# Patient Record
Sex: FEMALE | Race: BLACK OR AFRICAN AMERICAN | NOT HISPANIC OR LATINO | Employment: FULL TIME | URBAN - METROPOLITAN AREA
[De-identification: names, ages, dates, MRNs, and addresses within clinical notes are randomized per-mention and may not be internally consistent; named-entity substitution may affect disease eponyms.]

---

## 2021-03-20 PROBLEM — Z01.419 WELL WOMAN EXAM: Status: ACTIVE | Noted: 2021-03-20

## 2021-03-24 ENCOUNTER — OFFICE VISIT (OUTPATIENT)
Dept: OBSTETRICS AND GYNECOLOGY | Facility: CLINIC | Age: 41
End: 2021-03-24

## 2021-03-24 VITALS — SYSTOLIC BLOOD PRESSURE: 122 MMHG | DIASTOLIC BLOOD PRESSURE: 76 MMHG | RESPIRATION RATE: 14 BRPM | WEIGHT: 236 LBS

## 2021-03-24 DIAGNOSIS — N92.0 MENORRHAGIA WITH REGULAR CYCLE: ICD-10-CM

## 2021-03-24 DIAGNOSIS — Z30.09 BIRTH CONTROL COUNSELING: Primary | ICD-10-CM

## 2021-03-24 PROBLEM — Z97.5 IUD (INTRAUTERINE DEVICE) IN PLACE: Status: ACTIVE | Noted: 2021-03-24

## 2021-03-24 PROCEDURE — 99203 OFFICE O/P NEW LOW 30 MIN: CPT | Performed by: OBSTETRICS & GYNECOLOGY

## 2021-03-24 NOTE — PROGRESS NOTES
Subjective   Chief Complaint   Patient presents with   • Gynecologic Exam       Hilaria Gardner is a 40 y.o. year old .  Patient's last menstrual period was 2021 (approximate).  She presents because of wanting to talk about contraception.  She has a Mirena.  It is past due for being changed.  It was placed may be 2015.  She was happy with that and would like to have another Mirena if at all possible.  She has been told in the past they had a hard time seeing the string.  She is sexually active.  She has cycles that come predictably normal.  She feels drained because of the flow.  She did have a Pap smear done in  through her primary care's office.  It was done in 2020 and was normal.    She has not been sexually active for 6 months time.    The following portions of the patient's history were reviewed and updated as appropriate:current medications, allergies, past family history, past medical history, past social history and past surgical history    Social History    Tobacco Use      Smoking status: Never Smoker         Objective   /76   Resp 14   Wt 107 kg (236 lb)   LMP 2021 (Approximate)   Breastfeeding No     Lab Review   No data reviewed    Imaging   No data reviewed        Assessment   1. Contraceptive counseling.  Would like another Mirena if possible  2. Menorrhagia impacting day-to-day function     Plan   1. The following tests were ordered today: H/H.  It was explained to Hilaria that all lab test should be back within the one week after they are performed. She will be notified about the results, regardless of the findings. If she has not been contacted by the office within 2 weeks after the test has been performed, it is her responsibility to contact us to learn about her results.  2. The following data needs to be obtained to update her medical records: last PAP.  3. The importance of keeping all planned follow-up and taking all medications as prescribed was  emphasized.  4. Follow up IUD replacement after menses         This note was electronically signed.    Shakir Auguste M.D.  March 24, 2021    Total time spent today with Hilaria  was 30-44 minutes (level 3) - contraceptive choices and efficacy rates and pathophysiology of her presenting problem along with plans for any diagnositc work-up and treatment.    Note: Speech recognition transcription software may have been used to create portions of this document.  An attempt at proofreading has been made but errors in transcription could still be present.

## 2021-12-15 ENCOUNTER — OFFICE VISIT (OUTPATIENT)
Dept: FAMILY MEDICINE CLINIC | Facility: CLINIC | Age: 41
End: 2021-12-15

## 2021-12-15 ENCOUNTER — TRANSCRIBE ORDERS (OUTPATIENT)
Dept: OBSTETRICS AND GYNECOLOGY | Facility: CLINIC | Age: 41
End: 2021-12-15

## 2021-12-15 VITALS
WEIGHT: 237.8 LBS | DIASTOLIC BLOOD PRESSURE: 84 MMHG | SYSTOLIC BLOOD PRESSURE: 128 MMHG | BODY MASS INDEX: 36.04 KG/M2 | HEIGHT: 68 IN | RESPIRATION RATE: 16 BRPM | HEART RATE: 75 BPM | TEMPERATURE: 96.9 F

## 2021-12-15 DIAGNOSIS — Z12.31 ENCOUNTER FOR SCREENING MAMMOGRAM FOR MALIGNANT NEOPLASM OF BREAST: ICD-10-CM

## 2021-12-15 DIAGNOSIS — N62 LARGE BREASTS: ICD-10-CM

## 2021-12-15 DIAGNOSIS — Z00.00 ANNUAL PHYSICAL EXAM: Primary | ICD-10-CM

## 2021-12-15 PROCEDURE — 99386 PREV VISIT NEW AGE 40-64: CPT | Performed by: STUDENT IN AN ORGANIZED HEALTH CARE EDUCATION/TRAINING PROGRAM

## 2021-12-15 PROCEDURE — 99202 OFFICE O/P NEW SF 15 MIN: CPT | Performed by: STUDENT IN AN ORGANIZED HEALTH CARE EDUCATION/TRAINING PROGRAM

## 2021-12-15 NOTE — ASSESSMENT & PLAN NOTE
Counseled on diet and exercise including cardio and strength training 50 minutes of moderate exercise 3 times per week which she seems mani getting with her .   Had covid vaccine. Declines flu. Will schedule apt with her obgyn. Mammogram ordered.

## 2021-12-15 NOTE — PROGRESS NOTES
New Patient Office Visit      Patient Name: Hilaria Saldivar  : 1980   MRN: [id number]     Chief Complaint:  Back Pain (early summer, gotten worse  ) and Establish Care     History of Present Illness:     Back pain started in college. She says large breasts run in her family. With her second son she was at a G or H and she would like to consider breast reduction. She had PT ordered but was overwhelming with a new baby. She has lost 50-55 lbs but still has back pain. She does back strengthening exercising with weights and has been since July. Has been working with a . Her pain is an aching pain in the middle of her back mostly and in her shoulders. Says some numbness in her trap muscles bilaterally on occasion. She has taken aleve for the pain which has helped some.     Will order mammogram. Has OBGYN and will call them for pap smear. Had covid vaccine.     Subjective        History reviewed. No pertinent past medical history.    Past Surgical History:   Procedure Laterality Date   • LAPAROSCOPIC CHOLECYSTECTOMY  2016       Family History   Problem Relation Age of Onset   • Diabetes Mother    • No Known Problems Father    • Diabetes Maternal Grandmother    • Hypertension Maternal Grandmother    • No Known Problems Maternal Grandfather    • No Known Problems Paternal Grandmother    • No Known Problems Paternal Grandfather        Social History     Socioeconomic History   • Marital status:    Tobacco Use   • Smoking status: Never Smoker   • Smokeless tobacco: Never Used   Substance and Sexual Activity   • Alcohol use: Not Currently     Comment: occasional wine    • Drug use: Never   • Sexual activity: Not Currently     Partners: Male        No current outpatient medications on file.    No Known Allergies    Objective     Physical Exam:  Vitals:    12/15/21 1053   BP: 128/84   Pulse: 75   Resp: 16   Temp: 96.9 °F (36.1 °C)   TempSrc: Temporal   SpO2: Comment: unable to get due to  "finger nail polish   Weight: 108 kg (237 lb 12.8 oz)   Height: 172.7 cm (68\")   PainSc:   7   PainLoc: Back      Body mass index is 36.16 kg/m².     Physical Exam  Constitutional:       General: She is not in acute distress.     Appearance: Normal appearance.   HENT:      Head: Normocephalic and atraumatic.   Eyes:      Extraocular Movements: Extraocular movements intact.   Cardiovascular:      Rate and Rhythm: Normal rate and regular rhythm.      Heart sounds: No murmur heard.      Pulmonary:      Effort: Pulmonary effort is normal. No respiratory distress.      Breath sounds: Normal breath sounds.   Abdominal:      General: Abdomen is flat.   Musculoskeletal:         General: No swelling.      Cervical back: Normal range of motion.   Skin:     Findings: No rash.   Neurological:      General: No focal deficit present.      Mental Status: She is alert.   Psychiatric:         Mood and Affect: Mood normal.              Assessment / Plan      Assessment/Plan:   Diagnoses and all orders for this visit:    1. Annual physical exam (Primary)  Assessment & Plan:  Counseled on diet and exercise including cardio and strength training 50 minutes of moderate exercise 3 times per week which she seems mani getting with her .   Had covid vaccine. Declines flu. Will schedule apt with her obgyn. Mammogram ordered.          2. Encounter for screening mammogram for malignant neoplasm of breast  -     Mammo screening digital tomosynthesis bilateral w CAD; Future    3. Large breasts  Assessment & Plan:  Refer to plastic surgeon for back pain which seems to be related to large breasts.     Orders:  -     Ambulatory Referral to Plastic Surgery       Return in about 1 year (around 12/15/2022).       Caitlin Garcia D.O.  [facility]     "

## 2022-01-04 ENCOUNTER — TELEPHONE (OUTPATIENT)
Dept: FAMILY MEDICINE CLINIC | Facility: CLINIC | Age: 42
End: 2022-01-04

## 2022-01-05 DIAGNOSIS — M54.9 BACK PAIN, UNSPECIFIED BACK LOCATION, UNSPECIFIED BACK PAIN LATERALITY, UNSPECIFIED CHRONICITY: Primary | ICD-10-CM

## 2022-01-25 ENCOUNTER — APPOINTMENT (OUTPATIENT)
Dept: MAMMOGRAPHY | Facility: HOSPITAL | Age: 42
End: 2022-01-25

## 2022-01-28 ENCOUNTER — HOSPITAL ENCOUNTER (OUTPATIENT)
Dept: MAMMOGRAPHY | Facility: HOSPITAL | Age: 42
Discharge: HOME | End: 2022-01-28
Admitting: STUDENT IN AN ORGANIZED HEALTH CARE EDUCATION/TRAINING PROGRAM

## 2022-01-28 DIAGNOSIS — Z12.31 ENCOUNTER FOR SCREENING MAMMOGRAM FOR MALIGNANT NEOPLASM OF BREAST: ICD-10-CM

## 2022-01-28 PROCEDURE — 77063 BREAST TOMOSYNTHESIS BI: CPT

## 2022-01-28 PROCEDURE — 77063 BREAST TOMOSYNTHESIS BI: CPT | Performed by: RADIOLOGY

## 2022-01-28 PROCEDURE — 77067 SCR MAMMO BI INCL CAD: CPT

## 2022-01-28 PROCEDURE — 77067 SCR MAMMO BI INCL CAD: CPT | Performed by: RADIOLOGY

## 2022-03-07 ENCOUNTER — TELEPHONE (OUTPATIENT)
Dept: FAMILY MEDICINE CLINIC | Facility: CLINIC | Age: 42
End: 2022-03-07

## 2022-03-07 NOTE — TELEPHONE ENCOUNTER
Caller: Hilaria Saldivar    Relationship: Self    Best call back number: [phone]       What was the call regarding: PATIENT STATED THAT SHE IS DOING PHYSICAL THERAPY.  PATIENT ASKED HOW MANY REQUIRED SESSIONS IS SHE SUPPOSE TO DO.  PATIENT STATED THAT SHE HAS DONE 10 SESSIONS.     Do you require a callback: YES

## 2022-04-06 ENCOUNTER — TELEPHONE (OUTPATIENT)
Dept: FAMILY MEDICINE CLINIC | Facility: CLINIC | Age: 42
End: 2022-04-06

## 2022-04-06 NOTE — TELEPHONE ENCOUNTER
PATIENT CALLED TO LET YOU KNOW THAT SHE HAS COMPLETED HER THERAPY SESSIONS AND WANTS TO KNOW IF YOU HAVE LOCATED A SURGEON FOR HER OR IS SHE TO FOLLOW BACK UP WITH YOU.      Hilaria Saldivar    [phone]

## 2022-04-15 NOTE — TELEPHONE ENCOUNTER
Returned patient's call and informed her of note made by Patricia Banad. Patient stated her understanding. And stated she would have [facility] fax over her information.

## 2022-04-15 NOTE — TELEPHONE ENCOUNTER
Caller: Hilaria Saldivar    Relationship to patient: Self    Best call back number: [phone]    Patient is needing: PATIENT STATED THAT SHE GOT DISCONNECTED FROM [facility] AND WAS CALLING BACK TO SEE WHAT SHE WAS SAYING BECAUSE SHE DID NOT GET TO HEAR ANYTHING

## 2022-04-25 ENCOUNTER — TELEPHONE (OUTPATIENT)
Dept: FAMILY MEDICINE CLINIC | Facility: CLINIC | Age: 42
End: 2022-04-25

## 2022-04-25 NOTE — TELEPHONE ENCOUNTER
----- Message from Caitlin Garcia DO sent at 4/25/2022  2:19 PM EDT -----  Regarding: FW: Updates   Looks like we have [facility] notes from Jan and they wanted 30 more days of pt . We may need more recent for the referral  ----- Message -----  From: Pallazola, Amanda G, MA  Sent: 4/25/2022   1:49 PM EDT  To: Caitlin Garcia DO  Subject: FW: Updates                                        ----- Message -----  From: Hilaria Saldivar  Sent: 4/25/2022   5:36 AM EDT  To: [facility]  Subject: Updates                                          Kwaku Cedeno    I am checking back to see if Dr. Garcia received everything from [facility] and if updates regarding the surgery possible date?

## 2022-05-05 DIAGNOSIS — M25.50 ARTHRALGIA, UNSPECIFIED JOINT: ICD-10-CM

## 2022-05-05 DIAGNOSIS — M77.9 ENTHESOPATHY: ICD-10-CM

## 2022-05-05 DIAGNOSIS — R76.8 POSITIVE ANA (ANTINUCLEAR ANTIBODY): Primary | ICD-10-CM

## 2022-07-07 ENCOUNTER — OFFICE VISIT (OUTPATIENT)
Dept: OBSTETRICS AND GYNECOLOGY | Facility: CLINIC | Age: 42
End: 2022-07-07

## 2022-07-07 VITALS
WEIGHT: 237 LBS | HEIGHT: 68 IN | DIASTOLIC BLOOD PRESSURE: 70 MMHG | SYSTOLIC BLOOD PRESSURE: 110 MMHG | BODY MASS INDEX: 35.92 KG/M2

## 2022-07-07 DIAGNOSIS — Z30.432 ENCOUNTER FOR IUD REMOVAL: ICD-10-CM

## 2022-07-07 DIAGNOSIS — Z01.419 ENCOUNTER FOR GYNECOLOGICAL EXAMINATION WITHOUT ABNORMAL FINDING: Primary | ICD-10-CM

## 2022-07-07 PROCEDURE — 99396 PREV VISIT EST AGE 40-64: CPT | Performed by: NURSE PRACTITIONER

## 2022-07-07 PROCEDURE — 58301 REMOVE INTRAUTERINE DEVICE: CPT | Performed by: NURSE PRACTITIONER

## 2022-07-07 RX ORDER — OFLOXACIN 3 MG/ML
SOLUTION/ DROPS OPHTHALMIC
COMMUNITY
Start: 2022-06-09

## 2022-07-14 NOTE — PROGRESS NOTES
IUD Insertion    No LMP recorded. (Menstrual status: Other).  No sic since April. upt negative in office today  Date of procedure:  07/25/22  Risks and benefits discussed? yes  All questions answered? yes  Consents given by The patient  Written consent obtained? yes    Local anesthesia used:  no    Procedure documentation:    After verifying the patient had a low probability of being pregnant and met the criteria for insertion, a sterile speculum has placed and the cervix was cleansed with an antiseptic solution.  Vaginal discharge was scant.  The anterior lip of the cervix was grasped with a tenaculum and the uterine cavity was gently sounded. There was no difficulty passing the sound through the cervix.  Cervical dilation did not need to be performed prior to placing the IUD.  The uterus was midposition and sounded to 9 cms.  The Mirena was then prepared per the manufacturers instructions.    The Mirena was advanced to a point 2 cms from the fundus and then the arms were released from the sheath.  The device was advanced to the fundus and the device was released fully from the sheath. The string was cut 3 cms in length.  Bleeding from the cervix was scant.    She tolerated the procedure without any difficulty.     Post procedure instructions:It was reviewed that the Mirena will not alter the timing of when she bleeds but it may decrease the quantity of flow and cramps.  Roughly 30% of people will be amenorrheic over time.  Efficacy rate of 99.2% over 7 years was discussed.  Spontaneous expulsion rate of 1-2% was also discussed.  If she has any issue with fever or excessive bleeding or pain she is to call the office immediately.  Otherwise I would like to see her back in 6 weeks for string check.   This note was electronically signed.  FRANSISCO Cason  07/25/2022

## 2022-07-25 ENCOUNTER — OFFICE VISIT (OUTPATIENT)
Dept: OBSTETRICS AND GYNECOLOGY | Facility: CLINIC | Age: 42
End: 2022-07-25

## 2022-07-25 VITALS
HEIGHT: 68 IN | BODY MASS INDEX: 35.8 KG/M2 | WEIGHT: 236.2 LBS | SYSTOLIC BLOOD PRESSURE: 130 MMHG | DIASTOLIC BLOOD PRESSURE: 80 MMHG

## 2022-07-25 DIAGNOSIS — Z30.014 ENCOUNTER FOR INITIAL PRESCRIPTION OF INTRAUTERINE CONTRACEPTIVE DEVICE (IUD): Primary | ICD-10-CM

## 2022-07-25 LAB
B-HCG UR QL: NEGATIVE
EXPIRATION DATE: NORMAL
INTERNAL NEGATIVE CONTROL: NORMAL
INTERNAL POSITIVE CONTROL: NORMAL
Lab: NORMAL

## 2022-07-25 PROCEDURE — 81025 URINE PREGNANCY TEST: CPT | Performed by: NURSE PRACTITIONER

## 2022-07-25 PROCEDURE — 58300 INSERT INTRAUTERINE DEVICE: CPT | Performed by: NURSE PRACTITIONER

## 2022-07-27 ENCOUNTER — PATIENT MESSAGE (OUTPATIENT)
Dept: OBSTETRICS AND GYNECOLOGY | Facility: CLINIC | Age: 42
End: 2022-07-27

## 2022-07-27 DIAGNOSIS — Z30.431 IUD CHECK UP: Primary | ICD-10-CM

## 2022-08-04 ENCOUNTER — OFFICE VISIT (OUTPATIENT)
Dept: OBSTETRICS AND GYNECOLOGY | Facility: CLINIC | Age: 42
End: 2022-08-04

## 2022-08-04 VITALS
SYSTOLIC BLOOD PRESSURE: 130 MMHG | WEIGHT: 240 LBS | HEIGHT: 68 IN | BODY MASS INDEX: 36.37 KG/M2 | DIASTOLIC BLOOD PRESSURE: 80 MMHG

## 2022-08-04 DIAGNOSIS — N89.8 VAGINAL IRRITATION: Primary | ICD-10-CM

## 2022-08-04 PROCEDURE — 99213 OFFICE O/P EST LOW 20 MIN: CPT | Performed by: NURSE PRACTITIONER

## 2022-08-04 RX ORDER — TRIAMCINOLONE ACETONIDE 5 MG/G
1 CREAM TOPICAL 2 TIMES DAILY
Qty: 15 G | Refills: 0 | Status: SHIPPED | OUTPATIENT
Start: 2022-08-04

## 2022-08-04 NOTE — PROGRESS NOTES
"Subjective   Chief Complaint   Patient presents with   • Follow-up     Pt states that she is having itching at the entrance to here her iud lays     Hilaria Saldivar is a 42 y.o. year old .  No LMP recorded (lmp unknown). (Menstrual status: Other).  She presents to be seen because of vaginal irritation since placement of her IUD.  She had a Mirena placed about 2 weeks ago.  She denies pelvic pain or bleeding.  She states her vaginal irritation is mainly at the introitus.  She reports some increased white vaginal discharge.  Reports itching at the introitus.  Denies any vaginal odor.  She had negative STI screening her last visit.  She has her surgery date for her breast reduction on .    The following portions of the patient's history were reviewed and updated as appropriate:current medications and allergies    Social History    Tobacco Use      Smoking status: Never Smoker      Smokeless tobacco: Never Used         Objective   /80 (BP Location: Right arm, Patient Position: Sitting, Cuff Size: Adult)   Ht 172.7 cm (68\")   Wt 109 kg (240 lb)   LMP  (LMP Unknown)   Breastfeeding No   BMI 36.49 kg/m²    Pelvic exam: VULVA: normal appearing vulva with no masses, tenderness or lesions, VAGINA: normal appearing vagina with normal color and discharge, no lesions, CERVIX: normal appearing cervix without discharge or lesions, IUD strings noted in correct placement extending through the cervical os, WET MOUNT done - results: negative for pathogens, normal epithelial cells.  1 swab sent for vaginosis panel      Lab Review   STD swabs for GC, chlamydia and trichimoniasis    Imaging   No data reviewed        Assessment   1. Vaginal irritation  2. IUD in place     Plan   1. Discussed blood prep results with patient.  We will start low-dose steroid cream for twice daily use at introitus.  Will await 1 swab results for further treatment of vaginal irritation.  2. IUD strings noted in correct placement on " exam today.  Return to care for 6-week IUD follow-up as scheduled.  3. The importance of keeping all planned follow-up and taking all medications as prescribed was emphasized.  4. No follow-ups on file.    No orders of the defined types were placed in this encounter.         This note was electronically signed.    Georige Cobb, FRANSISCO  August 4, 2022

## 2022-08-30 ENCOUNTER — TELEPHONE (OUTPATIENT)
Dept: OBSTETRICS AND GYNECOLOGY | Facility: CLINIC | Age: 42
End: 2022-08-30

## 2022-08-30 NOTE — TELEPHONE ENCOUNTER
Caller: Hilaria Saldivar    Relationship to patient: Self    Best call back number:     Type of visit: GYN FOLLOW UP    If rescheduling, when is the original appointment: 8/30/2022    Additional notes: PT HAD TO R/S DUE TO SURGERY APPT FOR TODAY.

## 2022-10-17 ENCOUNTER — OFFICE VISIT (OUTPATIENT)
Dept: OBSTETRICS AND GYNECOLOGY | Facility: CLINIC | Age: 42
End: 2022-10-17

## 2022-10-17 VITALS — BODY MASS INDEX: 33.6 KG/M2 | WEIGHT: 221 LBS | RESPIRATION RATE: 14 BRPM

## 2022-10-17 DIAGNOSIS — Z30.431 IUD CHECK UP: Primary | ICD-10-CM

## 2022-10-17 PROCEDURE — 99212 OFFICE O/P EST SF 10 MIN: CPT | Performed by: NURSE PRACTITIONER

## 2022-10-17 NOTE — PROGRESS NOTES
Subjective   Chief Complaint   Patient presents with   • Follow-up     Iud string check     Hilaria Saldivar is a 42 y.o. year old .  No LMP recorded. (Menstrual status: Other).  She presents to be seen for iud string check. At her last appt she had some vaginal irritation.This has since resolved.  Since her last visit she has had breast reduction surgery. She states her back pain has already improved.   Her last period she has 5 days of dark brown spotting. She denies painful cramping.   The following portions of the patient's history were reviewed and updated as appropriate:current medications and allergies    Social History    Tobacco Use      Smoking status: Never      Smokeless tobacco: Never         Objective   Resp 14   Wt 100 kg (221 lb)   BMI 33.60 kg/m²   Pelvic exam: VULVA: normal appearing vulva with no masses, tenderness or lesions, VAGINA: normal appearing vagina with normal color and discharge, no lesions, CERVIX: normal appearing cervix without discharge or lesions, iud strings noted in correct placement extending through cervical os.    Lab Review   No data reviewed    Imaging   No data reviewed        Assessment   1. iud check     Plan   1. iud noted in correct placement in office today.   2. The importance of keeping all planned follow-up and taking all medications as prescribed was emphasized.  3. rtc for annual or prn    No orders of the defined types were placed in this encounter.         This note was electronically signed.    Georgie Cobb, FRANSISCO  2022

## 2022-12-15 ENCOUNTER — OFFICE VISIT (OUTPATIENT)
Dept: OBSTETRICS AND GYNECOLOGY | Facility: CLINIC | Age: 42
End: 2022-12-15

## 2022-12-15 VITALS
DIASTOLIC BLOOD PRESSURE: 80 MMHG | HEIGHT: 68 IN | BODY MASS INDEX: 35.52 KG/M2 | WEIGHT: 234.4 LBS | SYSTOLIC BLOOD PRESSURE: 110 MMHG

## 2022-12-15 DIAGNOSIS — Z11.3 ROUTINE SCREENING FOR STI (SEXUALLY TRANSMITTED INFECTION): Primary | ICD-10-CM

## 2022-12-15 PROCEDURE — 99212 OFFICE O/P EST SF 10 MIN: CPT | Performed by: NURSE PRACTITIONER

## 2022-12-15 NOTE — PROGRESS NOTES
"Subjective   Chief Complaint   Patient presents with   • Exposure to STD     Hilaria Saldivar is a 42 y.o. year old .  No LMP recorded (lmp unknown). Patient has had an implant.  She presents to be seen for STI screening.  She recently has become sexually active with a new partner and has not been consistent with condom use.  She denies any vaginal irritation or change in her discharge.  She is satisfied with her IUD.  Declines blood work today.    The following portions of the patient's history were reviewed and updated as appropriate:current medications and allergies    Social History    Tobacco Use      Smoking status: Never      Smokeless tobacco: Never         Objective   /80 (BP Location: Right arm, Patient Position: Sitting, Cuff Size: Adult)   Ht 172.7 cm (68\")   Wt 106 kg (234 lb 6.4 oz)   LMP  (LMP Unknown) Comment: Mirena  BMI 35.64 kg/m²   Pelvic exam: VULVA: normal appearing vulva with no masses, tenderness or lesions, VAGINA: normal appearing vagina with normal color and discharge, no lesions, CERVIX: normal appearing cervix without discharge or lesions, iud strings noted in correct placement .    Lab Review   No data reviewed    Imaging   No data reviewed        Assessment   1. Screening for sti     Plan   1. 1 swab collected for STI screening today.  Encouraged use of condoms with for decrease sti risk  2. The importance of keeping all planned follow-up and taking all medications as prescribed was emphasized.  3. rtc for annual or prn     No orders of the defined types were placed in this encounter.         This note was electronically signed.    Georgie Cobb, FRANSISCO  December 15, 2022        "

## 2022-12-19 DIAGNOSIS — Z11.3 ROUTINE SCREENING FOR STI (SEXUALLY TRANSMITTED INFECTION): ICD-10-CM

## 2023-03-02 ENCOUNTER — OFFICE VISIT (OUTPATIENT)
Dept: OBSTETRICS AND GYNECOLOGY | Facility: CLINIC | Age: 43
End: 2023-03-02

## 2023-03-02 VITALS
WEIGHT: 238 LBS | DIASTOLIC BLOOD PRESSURE: 80 MMHG | SYSTOLIC BLOOD PRESSURE: 120 MMHG | HEIGHT: 68 IN | BODY MASS INDEX: 36.07 KG/M2

## 2023-03-02 DIAGNOSIS — N88.9 CERVICAL LESION: ICD-10-CM

## 2023-03-02 DIAGNOSIS — N89.8 VAGINAL ODOR: Primary | ICD-10-CM

## 2023-03-02 DIAGNOSIS — Z71.1 CONCERN ABOUT STD IN FEMALE WITHOUT DIAGNOSIS: ICD-10-CM

## 2023-03-02 PROCEDURE — 99213 OFFICE O/P EST LOW 20 MIN: CPT | Performed by: NURSE PRACTITIONER

## 2023-03-02 RX ORDER — METRONIDAZOLE 500 MG/1
500 TABLET ORAL 2 TIMES DAILY
Qty: 14 TABLET | Refills: 0 | Status: SHIPPED | OUTPATIENT
Start: 2023-03-02 | End: 2023-03-06 | Stop reason: SDUPTHER

## 2023-03-02 NOTE — PROGRESS NOTES
"Subjective   Chief Complaint   Patient presents with   • Vaginal Discharge     Discharge for the pass 2 week and now has fishy smell and itchy     Hilaria Saldivar is a 42 y.o. year old .  No LMP recorded (lmp unknown). Patient has had an implant.  She presents to be seen because of vaginal odor which has been present for about 2 weeks. She had a new sexual partner and noticed odor after intercourse. She also has some burning after urinating.     The following portions of the patient's history were reviewed and updated as appropriate:current medications and allergies    Social History    Tobacco Use      Smoking status: Never      Smokeless tobacco: Never         Objective   /80 (BP Location: Right arm, Patient Position: Sitting, Cuff Size: Adult)   Ht 172.7 cm (68\")   Wt 108 kg (238 lb)   LMP  (LMP Unknown)   BMI 36.19 kg/m²   Pelvic exam: VULVA: normal appearing vulva with no masses, tenderness or lesions, VAGINA: normal appearing vagina with normal color and discharge, no lesions, CERVIX: normal appearing cervix without discharge or lesions, Nabothian cyst at 10-11 o'clock.  Possible nabothian cyst also noted at 11:00 however this area is more purple in color.  Pap collected.  IUD strings noted extending through cervical os.    Lab Review   No data reviewed    Imaging   No data reviewed        Assessment   1. Vaginal odor most consistent with bacterial vaginosis  2. STI screening  3. Probable nabothian cyst noted on cervix however with discoloration Pap collected     Plan   Pap and HPV were done today.  If she does not receive the results of the Pap within 2 weeks  time, she was instructed to call to find out the results.  I explained to Hilaria that the recommendations for Pap smear interval in a low risk patient's has lengthened to 5 years time if both cytology and HPV testing were normal.  I encouraged her to be seen yearly for a full physical exam including breast and pelvic exam even during the " off years when PAP's will not be performed.  1. 1 swab sent for STI screening bacterial vaginosis and yeast.  Symptoms most consistent with bacterial vaginosis prescription for Flagyl was sent to pharmacy on file.  Discussed with patient need to avoid alcohol for 24 to 40 hours after last dose of Flagyl.  She verbalized understanding plan of care.  2. The importance of keeping all planned follow-up and taking all medications as prescribed was emphasized.      No orders of the defined types were placed in this encounter.         This note was electronically signed.    Georgie Cobb, APRN  March 2, 2023

## 2023-03-06 DIAGNOSIS — N89.8 VAGINAL ODOR: ICD-10-CM

## 2023-03-06 RX ORDER — METRONIDAZOLE 500 MG/1
500 TABLET ORAL 2 TIMES DAILY
Qty: 14 TABLET | Refills: 0 | Status: SHIPPED | OUTPATIENT
Start: 2023-03-06 | End: 2023-03-13

## 2023-03-10 LAB — REF LAB TEST METHOD: NORMAL

## 2024-01-23 ENCOUNTER — TELEPHONE (OUTPATIENT)
Dept: OBSTETRICS AND GYNECOLOGY | Facility: CLINIC | Age: 44
End: 2024-01-23
Payer: MEDICAID

## 2024-01-23 NOTE — TELEPHONE ENCOUNTER
Hub staff attempted to follow warm transfer process and was unsuccessful     Caller: Hilaria Saldivar    Relationship to patient: Self    Best call back number: [phone]    Patient is needing: PT RETURNING MISSED CALL TO OFFICE FROM [place]. PT CAN BE REACHED AT ANYTIME TODAY

## 2024-01-23 NOTE — TELEPHONE ENCOUNTER
Attempted to contact pt and there was no answer.  Unable to leave message due to voicemail being full.

## 2024-01-24 ENCOUNTER — TELEPHONE (OUTPATIENT)
Dept: OBSTETRICS AND GYNECOLOGY | Facility: CLINIC | Age: 44
End: 2024-01-24
Payer: MEDICAID

## 2024-01-24 NOTE — TELEPHONE ENCOUNTER
Pt stated the reason for her call was to see if she could be seen sooner that what she was schedule for, call was forward to the front to get Pt schedule.

## 2024-01-24 NOTE — TELEPHONE ENCOUNTER
SERGIO    Received a call from patient stating she was returning a call regarding provider slot. If someone could please give her, it would be appreciated. Thank you kindly.

## 2024-01-25 ENCOUNTER — OFFICE VISIT (OUTPATIENT)
Dept: OBSTETRICS AND GYNECOLOGY | Facility: CLINIC | Age: 44
End: 2024-01-25
Payer: MEDICAID

## 2024-01-25 VITALS
BODY MASS INDEX: 38.13 KG/M2 | SYSTOLIC BLOOD PRESSURE: 120 MMHG | HEIGHT: 68 IN | WEIGHT: 251.6 LBS | DIASTOLIC BLOOD PRESSURE: 80 MMHG

## 2024-01-25 DIAGNOSIS — Z01.419 ENCOUNTER FOR GYNECOLOGICAL EXAMINATION WITHOUT ABNORMAL FINDING: ICD-10-CM

## 2024-01-25 DIAGNOSIS — R82.90 MALODOROUS URINE: ICD-10-CM

## 2024-01-25 DIAGNOSIS — Z12.31 SCREENING MAMMOGRAM FOR BREAST CANCER: ICD-10-CM

## 2024-01-25 DIAGNOSIS — Z30.431 IUD CHECK UP: ICD-10-CM

## 2024-01-25 DIAGNOSIS — N89.8 VAGINAL IRRITATION: Primary | ICD-10-CM

## 2024-01-25 NOTE — PROGRESS NOTES
Subjective   Chief Complaint   Patient presents with    Annual Exam     Well woman exam, VAGINAL DISCHARGE/ODOR / IUD CHECK AND ODOR.     Hilaria Saldivar is a 43 y.o. year old  presenting to be seen for her annual exam.   Her last Pap was 2023 with normal cytology and negative HPV cotesting.  She had a BI-RADS 1 mammogram 2022.  She states around early Nov she started having vaginal irritation which comes and goes, she noted an odor as well. She used azo over the counter (yeast and urinary support) and states this has helped some.  She has a new partner since her last visit.  SEXUAL Hx:  She is currently sexually active.  In the past year there there has been ONE new sexual partner.    Condoms are used intermittently.  She would like to be screened for STD's at today's exam.  Current birth control method: IUD - Mirena.  Placed 2022.  She is happy with her current method of contraception and does not want to discuss alternative methods of contraception.  MENSTRUAL Hx:  No LMP recorded. Patient has had an implant.  In the past 6 months her cycles have been absent.  Her menstrual flow has been absent.   Each month on average there are roughly 0 day(s) of very heavy flow.  She did have one heavy cycle about 6 months ago, none since, lasted   Intermenstrual bleeding is absent.    Post-coital bleeding is absent.  Dysmenorrhea: moderate and is not affecting her activities of daily living  PMS: is not affecting her activities of daily living  Her cycles are not a source of concern for her that she wishes to discuss today.  HEALTH Hx:  She exercises regularly: yes.  She wears her seat belt: yes.  She has concerns about domestic violence: no.  OTHER THINGS SHE WANTS TO DISCUSS TODAY:  Nothing else    The following portions of the patient's history were reviewed and updated as appropriate:problem list, current medications, allergies, past family history, past medical history, past social history, and  "past surgical history.    Social History    Tobacco Use      Smoking status: Never      Smokeless tobacco: Never    Review of Systems  Constitutional POS: nothing reported    NEG: anorexia or night sweats   Genitourinary POS:  malodorous urine    NEG: dysuria or hematuria      Gastointestinal POS: nothing reported    NEG: bloating, change in bowel habits, melena, or reflux symptoms   Integument POS: nothing reported    NEG: moles that are changing in size, shape, color or rashes   Breast POS: nothing reported    NEG: persistent breast lump, skin dimpling, or nipple discharge        Objective   /80 (BP Location: Left arm, Patient Position: Sitting, Cuff Size: Adult)   Ht 172.7 cm (68\")   Wt 114 kg (251 lb 9.6 oz)   BMI 38.26 kg/m²     General:  well developed; well nourished  no acute distress   Skin:  No suspicious lesions seen   Thyroid: normal to inspection and palpation   Breasts:  Examined in supine position  Symmetric without masses or skin dimpling  Nipples normal without inversion, lesions or discharge  There are no palpable axillary nodes  Bilateral breast reduction scars are noted   Abdomen: soft, non-tender; no masses  no umbilical or inguinal hernias are present  no hepato-splenomegaly   Pelvis: Clinical staff was present for exam  :  urethral meatus normal;  Vaginal:  normal pink mucosa without prolapse or lesions.  Cervix:  normal appearance. IUD string present - 3 cms in length;  Uterus:  normal size, shape and consistency.  Adnexa:  normal bimanual exam of the adnexa.  Rectal:  digital rectal exam not performed; anus visually normal appearing.        Assessment   Well woman with routine gynecological exam  IUD in place  Vaginal irritation  Malodorous urine     Plan     Pap was not done today.  I explained to Hilaria that the recommendations for Pap smear interval in a low risk patient has lengthened to 3 years time.  I told Hilaria she still needs to be seen in our office yearly for a full " physical including breast and pelvic exam.  She was encouraged to get yearly mammograms.  She should report any palpable breast lump(s) or skin changes regardless of mammographic findings.  I explained to Hilaria that notification regarding her mammogram results will come from the center performing the study.  Our office will not be routinely calling with mammogram results.  It is her responsibility to make sure that the results from the mammogram are communicated to her by the breast center.  If she has any questions about the results, she is welcome to call our office anytime.  1 swab collected for vaginitis and STD panel.  Discussed use of boric acid suppositories for postcoital use if her symptoms are mainly after intercourse.  Urine sent for urinalysis  The importance of keeping all planned follow-up and taking all medications as prescribed was emphasized.  Today I discussed with Hilaria the total recommended calcium intake for a premenopausal female is 1000 mg.  Ideally this should be from dietary sources.  I reviewed calcium content in various foods including milk, fortified orange juice and yogurt.  If she cannot get sufficient calcium through dietary means, it is recommended to supplement with either a multivitamin or calcium to reach her daily goal.  I also reviewed the difference in the bioavailability of calcium carbonate and calcium citrate containing supplements and the importance of taking calcium carbonate containing products with food.  Finally, vitamin D's role in calcium absorption was reviewed and a total daily vitamin D intake of 800 units was recommended.  Follow up for annual exam 1 year or as needed    No orders of the defined types were placed in this encounter.         This note was electronically signed.    Georgie Cobb, FRANSISCO  January 25, 2024

## 2024-01-26 ENCOUNTER — TELEPHONE (OUTPATIENT)
Dept: OBSTETRICS AND GYNECOLOGY | Facility: CLINIC | Age: 44
End: 2024-01-26
Payer: MEDICAID

## 2024-01-26 NOTE — TELEPHONE ENCOUNTER
[phone] called patient, mailbox is full. I sent patient a [employer] message asking her to go to the lab to leave a urine specimen and we will call her with the results.

## 2024-01-26 NOTE — TELEPHONE ENCOUNTER
I tried to call patient to make her aware that I place her urine sample in the wrong basket on 1/26/24 and it was not picked up. I needed to ask her to go to the lab and leave another sample and that the order was in her chart. I could not reach patient due to mailbox was full.

## 2024-01-27 ENCOUNTER — LAB (OUTPATIENT)
Dept: LAB | Facility: HOSPITAL | Age: 44
End: 2024-01-27
Payer: MEDICAID

## 2024-01-27 DIAGNOSIS — R82.90 MALODOROUS URINE: ICD-10-CM

## 2024-01-27 DIAGNOSIS — N89.8 VAGINAL IRRITATION: ICD-10-CM

## 2024-01-27 PROCEDURE — 81001 URINALYSIS AUTO W/SCOPE: CPT

## 2024-01-29 LAB
BACTERIA UR QL AUTO: ABNORMAL /HPF
BILIRUB UR QL STRIP: NEGATIVE
CLARITY UR: CLEAR
COLOR UR: YELLOW
GLUCOSE UR STRIP-MCNC: NEGATIVE MG/DL
HGB UR QL STRIP.AUTO: NEGATIVE
HOLD SPECIMEN: NORMAL
HYALINE CASTS UR QL AUTO: ABNORMAL /LPF
KETONES UR QL STRIP: NEGATIVE
LEUKOCYTE ESTERASE UR QL STRIP.AUTO: ABNORMAL
NITRITE UR QL STRIP: NEGATIVE
PH UR STRIP.AUTO: 5.5 [PH] (ref 5–8)
PROT UR QL STRIP: NEGATIVE
RBC # UR STRIP: ABNORMAL /HPF
REF LAB TEST METHOD: ABNORMAL
SP GR UR STRIP: 1.02 (ref 1–1.03)
SQUAMOUS #/AREA URNS HPF: ABNORMAL /HPF
UROBILINOGEN UR QL STRIP: ABNORMAL
WBC # UR STRIP: ABNORMAL /HPF

## 2024-08-16 ENCOUNTER — TELEPHONE (OUTPATIENT)
Dept: FAMILY MEDICINE CLINIC | Facility: CLINIC | Age: 44
End: 2024-08-16
Payer: COMMERCIAL

## 2024-10-11 ENCOUNTER — TELEPHONE (OUTPATIENT)
Dept: OBSTETRICS AND GYNECOLOGY | Facility: CLINIC | Age: 44
End: 2024-10-11

## 2024-10-11 NOTE — TELEPHONE ENCOUNTER
Caller: Hilaria Saldivar    Relationship to patient: Self    Best call back number: [phone] (home)       Patient is needing: PT IS PRETTY UNCOMFORTABLE. WOULD LIKE A SOONER APPT THAN 10/16/2024.

## 2024-10-14 ENCOUNTER — OFFICE VISIT (OUTPATIENT)
Dept: OBSTETRICS AND GYNECOLOGY | Facility: CLINIC | Age: 44
End: 2024-10-14
Payer: COMMERCIAL

## 2024-10-14 VITALS — WEIGHT: 241 LBS | BODY MASS INDEX: 36.64 KG/M2 | DIASTOLIC BLOOD PRESSURE: 70 MMHG | SYSTOLIC BLOOD PRESSURE: 110 MMHG

## 2024-10-14 DIAGNOSIS — N89.8 VAGINAL DISCHARGE: Primary | ICD-10-CM

## 2024-10-14 DIAGNOSIS — N89.8 VAGINAL IRRITATION: ICD-10-CM

## 2024-10-14 PROCEDURE — 99213 OFFICE O/P EST LOW 20 MIN: CPT

## 2024-10-14 RX ORDER — LEVONORGESTREL 52 MG/1
1 INTRAUTERINE DEVICE INTRAUTERINE
COMMUNITY

## 2024-10-14 NOTE — PROGRESS NOTES
Subjective   Chief Complaint   Patient presents with    Vaginal Discharge     Vag irritation and iud check     Hilaria Saldivar is a 44 y.o. year old  presenting to be seen for evaluation of an abnormal vaginal discharge. The discharge is light yellow.  Her symptoms have been present for 2 month(s).  Additional she has noticed local irritation and vulvar itching.    Prior to the onset of symptoms she was not on systemic antibiotics.  She has recently changed soaps/detergents/toilet tissue (soap- Dove to Olay with some fragrance).  Prior to this visit, she has used AZO (Urinary and yeast support) in an attempt to improve her symptoms. She reports mild improvement in symptoms.     SEXUAL Hx:  She is not currently sexually active.  In the past year there there has been ONE new sexual partner.    Condoms are never used.  She would like to be screened for STD's at today's exam.  Current birth control method: IUD - Mirena.  She is happy with her current method of contraception and does not want to discuss alternative methods of contraception..  MENSTRUAL Hx:  No LMP recorded. Patient has had an implant.  In the past 6 months her cycles have been absent.   Her menstrual flow has been absent.   Each month on average there are roughly  0 days of very heavy flow.    Intermenstrual bleeding is absent.    Post-coital bleeding is absent.  Dysmenorrhea: none and is not affecting her activities of daily living  PMS: none and is not affecting her activities of daily living  OTHER THINGS SHE WANTS TO DISCUSS TODAY:  She had one incident of spotting that lasted about a day or so about a month ago. She otherwise is amenorrheic with the Mirena IUD.     The following portions of the patient's history were reviewed and updated as appropriate:current medications, allergies, past medical history, and past social history    Review of Systems       Objective   /70   Wt 109 kg (241 lb)   BMI 36.64 kg/m²     Physical Exam  Vitals  reviewed. Exam conducted with a chaperone present.   Genitourinary:     General: Normal vulva.      Exam position: Lithotomy position.      Vagina: Normal. No vaginal discharge.      Cervix: Normal.      Comments: IUD strings visualized at an appropriate length         Lab Review   No data reviewed    Imaging   No data reviewed       Assessment & Plan   Diagnoses and all orders for this visit:    1. Vaginal discharge (Primary)  -     OneSwab - Kit, Cervix, Endocervix; Future    2. Vaginal irritation  -     OneSwab - Kit, Cervix, Endocervix; Future    No obvious signs of irritation today. Will wait for OneSwab results at treat at that time. Educated patient regarding vaginal hygiene and use of probiotic and boric acid vaginal suppositories.     The importance of keeping all planned follow-up and taking all medications as prescribed was emphasized.    Return in about 15 weeks (around 1/27/2025) for Annual physical.    No orders of the defined types were placed in this encounter.                This note was electronically signed.    Ana María Porras, FRANSISCO  October 14, 2024

## 2024-10-22 ENCOUNTER — PATIENT MESSAGE (OUTPATIENT)
Dept: OBSTETRICS AND GYNECOLOGY | Facility: CLINIC | Age: 44
End: 2024-10-22
Payer: COMMERCIAL

## 2024-12-12 ENCOUNTER — OFFICE VISIT (OUTPATIENT)
Age: 44
End: 2024-12-12
Payer: COMMERCIAL

## 2024-12-12 VITALS
WEIGHT: 255 LBS | SYSTOLIC BLOOD PRESSURE: 118 MMHG | BODY MASS INDEX: 38.77 KG/M2 | DIASTOLIC BLOOD PRESSURE: 80 MMHG | RESPIRATION RATE: 16 BRPM

## 2024-12-12 DIAGNOSIS — N89.8 VAGINAL DISCHARGE: Primary | ICD-10-CM

## 2024-12-12 NOTE — PROGRESS NOTES
Subjective   Chief Complaint   Patient presents with    Vaginal Discharge     With odor     Hilaria Saldivar is a 44 y.o. year old  presenting to be seen for evaluation of an abnormal vaginal discharge. The discharge is yellow- some days thick, some days thin. It leaves a brown stain in her underwear.  Her symptoms have been present for 2 week(s).  Additional she has noticed odor and itching just inside vagina .    Prior to the onset of symptoms she was not on systemic antibiotics.  She has recently changed soaps/detergents/toilet tissue.  Prior to this visit, she has used   OTC AZO yeast relief in an attempt to improve her symptoms. She reports it helps somewhat with odor but it does not fully resolve and comes back.    SEXUAL Hx:  She is currently sexually active.  In the past year there there has been ONE new sexual partner.    Condoms are never used.  She would not like to be screened for STD's at today's exam.  Current birth control method: IUD - Mirena.  She is happy with her current method of contraception and does not want to discuss alternative methods of contraception..  MENSTRUAL Hx:  No LMP recorded. Patient has had an implant.  In the past 6 months her cycles have been absent.   Her menstrual flow has been absent.   Each month on average there are roughly  0 days of very heavy flow.    Intermenstrual bleeding is absent.    Post-coital bleeding is absent.  Dysmenorrhea: none and is not affecting her activities of daily living  PMS: none and is not affecting her activities of daily living  OTHER THINGS SHE WANTS TO DISCUSS TODAY:  Nothing else    The following portions of the patient's history were reviewed and updated as appropriate:current medications, allergies, past medical history, and past social history    Review of Systems   Genitourinary:  Positive for vaginal discharge.          Objective   /80   Resp 16   Wt 116 kg (255 lb)   BMI 38.77 kg/m²     Physical Exam  Vitals and nursing  note reviewed. Exam conducted with a chaperone present.   Genitourinary:     General: Normal vulva.      Exam position: Lithotomy position.      Vagina: Normal. Vaginal discharge present.      Cervix: Normal.   Psychiatric:         Mood and Affect: Mood normal.         Behavior: Behavior normal.         Thought Content: Thought content normal.         Judgment: Judgment normal.          Lab Review   No data reviewed    Imaging   No data reviewed       Assessment & Plan   Diagnoses and all orders for this visit:    1. Vaginal discharge (Primary)  -     OneSwab - Kit, Cervix, Endocervix; Future    No obvious source of discharge or infectious process today. Will await OneSwab result for appropriate treatment.     Reiterated good vaginal hygiene (no soaps inside, cotton underwear, etc). Also recommended probiotic and / or vaginal boric acid suppositories. She voiced understanding.     The importance of keeping all planned follow-up and taking all medications as prescribed was emphasized.    Return in about 3 months (around 3/10/2025) for Annual physical.    No orders of the defined types were placed in this encounter.                This note was electronically signed.    Ana María Porras, FRANSISCO  December 12, 2024

## 2024-12-19 RX ORDER — METRONIDAZOLE 7.5 MG/G
1 GEL VAGINAL 2 TIMES DAILY
Qty: 1 EACH | Refills: 0 | Status: SHIPPED | OUTPATIENT
Start: 2024-12-19 | End: 2024-12-23

## 2025-01-13 ENCOUNTER — TELEPHONE (OUTPATIENT)
Dept: FAMILY MEDICINE CLINIC | Facility: CLINIC | Age: 45
End: 2025-01-13
Payer: COMMERCIAL

## 2025-03-17 ENCOUNTER — OFFICE VISIT (OUTPATIENT)
Age: 45
End: 2025-03-17
Payer: COMMERCIAL

## 2025-03-17 VITALS
DIASTOLIC BLOOD PRESSURE: 88 MMHG | SYSTOLIC BLOOD PRESSURE: 140 MMHG | HEIGHT: 67 IN | BODY MASS INDEX: 40.02 KG/M2 | WEIGHT: 255 LBS

## 2025-03-17 DIAGNOSIS — Z12.31 SCREENING MAMMOGRAM FOR BREAST CANCER: ICD-10-CM

## 2025-03-17 DIAGNOSIS — Z01.419 ENCOUNTER FOR GYNECOLOGICAL EXAMINATION WITHOUT ABNORMAL FINDING: Primary | ICD-10-CM

## 2025-03-17 PROBLEM — N62 LARGE BREASTS: Status: RESOLVED | Noted: 2021-12-15 | Resolved: 2025-03-17

## 2025-03-17 NOTE — PROGRESS NOTES
Subjective   Chief Complaint   Patient presents with    Annual Exam     Well woman exam     Hilaria Saldivar is a 44 y.o. year old  presenting to be seen for her annual exam.   She had some issues with vaginal irritation last week which she thinks was related to a change in toilet paper, improved today. She has reports of increased vaginal discharge at times, unsure if this is cyclic in nature. Does not feel like this is yeast or bv at this time. Has no concerns for sti but would like screening.  She has ordered a lactobacillus containing probiotic, has not started yet but plans to soon.   She has not had a mammogram since her breast reduction in , would like order sent today.  She said since her breast reduction she has had a great improvement in her shoulder and neck pain.  She will still get indents in her shoulder for her bra straps at times.  She states exercising is much easier after her reduction as well.  Overall she is very satisfied with results.      SEXUAL Hx:  She is currently sexually active.  In the past year there there has been NO new sexual partners.    Condoms are used intermittently.  She would not like to be screened for STD's at today's exam.  Current birth control method: IUD - Mirena. 2022  She is happy with her current method of contraception and does not want to discuss alternative methods of contraception.  MENSTRUAL Hx:  No LMP recorded (lmp unknown). Patient has had an implant.  In the past 6 months her cycles have been absent with mirena Her menstrual flow has been absent.   Each month on average there are roughly 0 day(s) of very heavy flow.    Intermenstrual bleeding is absent.    Post-coital bleeding is absent.  Dysmenorrhea: is not affecting her activities of daily living  PMS: is not affecting her activities of daily living  Her cycles are not a source of concern for her that she wishes to discuss today.  HEALTH Hx:  She exercises regularly: yes.  She wears her seat belt:  "yes.  She has concerns about domestic violence: no.  OTHER THINGS SHE WANTS TO DISCUSS TODAY:  Nothing else    The following portions of the patient's history were reviewed and updated as appropriate:problem list, current medications, allergies, past family history, past medical history, past social history, and past surgical history.    Social History    Tobacco Use      Smoking status: Never      Smokeless tobacco: Never    Review of Systems  Constitutional POS: nothing reported    NEG: anorexia or night sweats   Genitourinary POS: nothing reported    NEG: dysuria or hematuria      Gastointestinal POS: nothing reported    NEG: bloating, change in bowel habits, melena, or reflux symptoms   Integument POS: nothing reported    NEG: moles that are changing in size, shape, color or rashes   Breast POS: nothing reported    NEG: persistent breast lump, skin dimpling, or nipple discharge        Objective   /88 (BP Location: Left arm, Patient Position: Sitting, Cuff Size: Adult)   Ht 170.2 cm (67\")   Wt 116 kg (255 lb)   LMP  (LMP Unknown)   BMI 39.94 kg/m²     General:  well developed; well nourished  no acute distress  mentation appropriate  obese - Body mass index is 39.94 kg/m².   Skin:  No suspicious lesions seen   Thyroid: normal to inspection and palpation   Breasts:  Examined in supine position  Symmetric without masses or skin dimpling  Nipples normal without inversion, lesions or discharge  There are no palpable axillary nodes  Bilateral breast reduction scars are noted   Abdomen: soft, non-tender; no masses  no umbilical or inguinal hernias are present  no hepato-splenomegaly   Pelvis: Clinical staff was present for exam  :  urethral meatus normal; urethra normal:  Vaginal:  normal pink mucosa without prolapse or lesions.  Cervix:  normal appearance. IUD string present - 3 cms in length; thick mucoid discharge noted   Uterus:  normal size, shape and consistency.  Adnexa:  normal bimanual exam of the " adnexa.  Rectal:  digital rectal exam not performed; anus visually normal appearing.        Assessment   Well woman with routine gynecological exam  IUD in place  Cervical mucoid discharge     Plan     Pap and STD testing was done today.  If she does not receive the results of the Pap within 2 weeks  time, she was instructed to call to find out the results.  I explained to Hilaria that the recommendations for Pap smear interval in a low risk patient's has lengthened to 3 years time.  I encouraged her to be seen yearly for a full physical exam including breast and pelvic exam even during the off years when PAP's will not be performed.  IUD current until July 2030.  Discussed if menstrual cycles returned and are bothersome can replace IUD anytime.   Discussed discharge could be related to progesterone IUD use.  Will review STD screening when resulted.  Encouraged to start lactobacillus containing probiotic.  The importance of keeping all planned follow-up and taking all medications as prescribed was emphasized.  Today I discussed with Hilaria the total recommended calcium intake for a premenopausal female is 1000 mg.  Ideally this should be from dietary sources.  I reviewed calcium content in various foods including milk, fortified orange juice and yogurt.  If she cannot get sufficient calcium through dietary means, it is recommended to supplement with either a multivitamin or calcium to reach her daily goal.  I also reviewed the difference in the bioavailability of calcium carbonate and calcium citrate containing supplements and the importance of taking calcium carbonate containing products with food.  Finally, vitamin D's role in calcium absorption was reviewed and a total daily vitamin D intake of 800 units was recommended.  She was encouraged to get yearly mammograms.  She should report any palpable breast lump(s) or skin changes regardless of mammographic findings.  I explained to Hilaria that notification regarding  her mammogram results will come from the center performing the study.  Our office will not be routinely calling with mammogram results.  It is her responsibility to make sure that the results from the mammogram are communicated to her by the breast center.  If she has any questions about the results, she is welcome to call our office anytime.  Discussed colon cancer screening starting age 45  Follow up for annual exam 1 year    No orders of the defined types were placed in this encounter.         This note was electronically signed.    Georgie Cobb, FRANSISCO  March 17, 2025

## 2025-03-18 LAB — REF LAB TEST METHOD: NORMAL

## 2025-03-19 ENCOUNTER — TELEPHONE (OUTPATIENT)
Dept: OBSTETRICS AND GYNECOLOGY | Facility: CLINIC | Age: 45
End: 2025-03-19
Payer: COMMERCIAL

## 2025-03-19 RX ORDER — DOXYCYCLINE 100 MG/1
100 CAPSULE ORAL 2 TIMES DAILY
Qty: 14 CAPSULE | Refills: 0 | Status: SHIPPED | OUTPATIENT
Start: 2025-03-19 | End: 2025-03-26

## 2025-03-19 RX ORDER — DOXYCYCLINE 100 MG/1
100 CAPSULE ORAL 2 TIMES DAILY
Qty: 14 CAPSULE | Refills: 0 | Status: SHIPPED | OUTPATIENT
Start: 2025-03-19 | End: 2025-03-19 | Stop reason: SDUPTHER

## 2025-03-19 NOTE — TELEPHONE ENCOUNTER
Provider: EDWIN HILL    Caller: Hilaria Saldivar    Relationship to Patient: Self    Pharmacy: [facility] @ [address] [place] [address]    Phone Number: [phone]    Reason for Call: PT WOULD LIKE HER NEW PRESC OF MONODOX SENT TO THIS RX ABOVE INSTEAD OF THE ONE IT GOT SENT TO : [facility] -       
Patient/Caregiver provided printed discharge information.

## 2025-06-26 ENCOUNTER — TELEPHONE (OUTPATIENT)
Dept: FAMILY MEDICINE CLINIC | Facility: CLINIC | Age: 45
End: 2025-06-26
Payer: COMMERCIAL

## 2025-06-26 NOTE — TELEPHONE ENCOUNTER
Patient had an office visit scheduled with Dr. Garcia on 6/30/25, provider will be out. Called pt to reschedule, no answer, left vm. Rescheduled and sent letter.

## 2025-07-07 ENCOUNTER — OFFICE VISIT (OUTPATIENT)
Dept: FAMILY MEDICINE CLINIC | Facility: CLINIC | Age: 45
End: 2025-07-07
Payer: COMMERCIAL

## 2025-07-07 VITALS
DIASTOLIC BLOOD PRESSURE: 94 MMHG | BODY MASS INDEX: 40.97 KG/M2 | WEIGHT: 261 LBS | RESPIRATION RATE: 20 BRPM | HEIGHT: 67 IN | TEMPERATURE: 98.9 F | HEART RATE: 88 BPM | SYSTOLIC BLOOD PRESSURE: 122 MMHG

## 2025-07-07 DIAGNOSIS — Z83.3 FAMILY HISTORY OF DIABETES MELLITUS: ICD-10-CM

## 2025-07-07 DIAGNOSIS — E66.01 MORBID (SEVERE) OBESITY DUE TO EXCESS CALORIES: ICD-10-CM

## 2025-07-07 DIAGNOSIS — Z13.220 LIPID SCREENING: ICD-10-CM

## 2025-07-07 DIAGNOSIS — Z11.59 ENCOUNTER FOR HEPATITIS C SCREENING TEST FOR LOW RISK PATIENT: ICD-10-CM

## 2025-07-07 DIAGNOSIS — M76.62 ACHILLES TENDONITIS, BILATERAL: Primary | ICD-10-CM

## 2025-07-07 DIAGNOSIS — M76.61 ACHILLES TENDONITIS, BILATERAL: Primary | ICD-10-CM

## 2025-07-07 DIAGNOSIS — Z12.31 ENCOUNTER FOR SCREENING MAMMOGRAM FOR MALIGNANT NEOPLASM OF BREAST: ICD-10-CM

## 2025-07-07 PROCEDURE — 99214 OFFICE O/P EST MOD 30 MIN: CPT | Performed by: PHYSICIAN ASSISTANT

## 2025-07-07 NOTE — PROGRESS NOTES
Follow Up Office Visit      Date: 2025   Patient Name: Hilaria Saldivar  : 1980   MRN: [id number]     Chief Complaint:    Chief Complaint   Patient presents with    Tendonitis     Pt has hx achilles tendon pain  and states it has been really bothering her and she walks for long periods of time at work.     Obesity     Pt would like to dicuss options to help her lose weight       History of Present Illness  The patient is a 45-year-old female presenting today to discuss weight loss and Achilles tendon pain.    She has been experiencing Achilles tendon pain for approximately 1.5 years, which began spontaneously after standing up from a lying position. The initial burning sensation was localized around her left Achilles tendon, causing her to limp slightly. Over the past 3 months, the pain has spread to both sides without any specific injury. The pain intensifies during the summer and is milder in the winter and fall. Her daily routine involves extensive walking on campus, particularly in the summer, which exacerbates the pain to the point of causing a limp by day's end. Prolonged standing also triggers the pain. She rates her current pain level as 7 out of 10. She reports no prior injuries or surgeries involving her legs or Achilles tendon. She consulted a podiatrist about 2 years ago, who diagnosed inflammation in her left foot and recommended compression socks, which she finds uncomfortable and does not wear regularly. She has not undergone physical therapy for her pain. Applying ice to the area provides some temporary relief. A 7-day course of prednisone prescribed by an urgent care center in 2025 was beneficial, but she did not start it until recently due to decreased activity during the winter months. She completed the 21-day course of prednisone last week, after which the pain returned. Over-the-counter medications such as ibuprofen and Tylenol provide minimal relief.    She has a family history  of obesity and diabetes and has struggled with weight management despite various dietary attempts. Her current weight is 261 pounds, with her lowest adult weight being 217 pounds. She engages in regular exercise, walking for 30 to 40 minutes 2 to 3 days a week with her son or going to the gym. Her diet includes home-cooked meals with fish and chicken, occasional beef, and tacos on Tuesdays. She also occasionally eats out. She has tried intermittent fasting, which helped reduce her weight, but she has not followed this regimen for a year. She consumes 3 to 4 small meals a day, including broccoli, bananas, and peanut butter for fiber. She has never participated in a weight loss program or taken weight loss medications. Her goal weight is 200 pounds. She is considering Ozempic for weight loss. She reports no personal history of pancreatitis or medullary thyroid cancer/MEN2    FAMILY HISTORY  Her mother is overweight and sees a doctor for weight management. Her grandmother was also overweight. She has a family history of diabetes and high blood pressure.      Subjective      Review of Systems:   Review of Systems   Constitutional:  Negative for chills, diaphoresis, fatigue and fever.   HENT:  Negative for congestion, sore throat and swollen glands.    Respiratory:  Negative for cough.    Cardiovascular:  Negative for chest pain.   Gastrointestinal:  Negative for abdominal pain, nausea and vomiting.   Genitourinary:  Negative for dysuria.   Musculoskeletal:  Positive for arthralgias (heel and lower calf) and myalgias (heel and lower calf). Negative for neck pain.   Skin:  Negative for rash.   Neurological:  Negative for numbness.       I have reviewed the patients family history, social history, past medical history, past surgical history and have updated it as appropriate.     Medications:     Current Outpatient Medications:     APPLE CIDER VINEGAR PO, Take  by mouth., Disp: , Rfl:     Levonorgestrel (Mirena, 52 MG,) 20  "MCG/DAY intrauterine device IUD, To be inserted one time by prescriber. Route intrauterine., Disp: , Rfl:     Allergies:   No Known Allergies    Objective     Physical Exam: Please see above  Vital Signs:   Vitals:    07/07/25 1625   BP: 122/94   Pulse: 88   Resp: 20   Temp: 98.9 °F (37.2 °C)   TempSrc: Temporal   Weight: 118 kg (261 lb)   Height: 170.2 cm (67.01\")   PainSc: 8      Body mass index is 40.87 kg/m².  Class 3 Severe Obesity (BMI >=40). Obesity-related health conditions include the following: MSK pains. Obesity is worsening. BMI is is above average; BMI management plan is completed. We discussed portion control, increasing exercise, pharmacologic options including GLP1 receptor agonists, and referral to weight management program.       Physical Exam  Vitals and nursing note reviewed.   Constitutional:       General: She is not in acute distress.     Appearance: She is morbidly obese. She is not ill-appearing, toxic-appearing or diaphoretic.   Cardiovascular:      Rate and Rhythm: Normal rate and regular rhythm.      Heart sounds: No murmur heard.  Pulmonary:      Effort: Pulmonary effort is normal. No respiratory distress.      Breath sounds: Normal breath sounds. No wheezing.   Musculoskeletal:      Right lower leg: No edema.      Left lower leg: No edema.      Right ankle: No swelling. No tenderness. Normal range of motion. Normal pulse.      Right Achilles Tendon: Tenderness present. No defects. Nettles's test negative.      Left ankle: No swelling. No tenderness. Normal range of motion. Normal pulse.      Left Achilles Tendon: Tenderness present. No defects. Nettles's test negative.      Right foot: Normal. Normal range of motion and normal capillary refill. No swelling or tenderness. Normal pulse.      Left foot: Normal. Normal range of motion and normal capillary refill. No swelling or tenderness. Normal pulse.         Results:   Labs:   No results found for: \"HGBA1C\", \"CMP\", \"CBCDIFFPANEL\", " "\"CREAT\", \"TSH\"     POCT Results (if applicable):   Results for orders placed or performed in visit on 25   LIQUID-BASED PAP SMEAR WITH HPV GENOTYPING REGARDLESS OF INTERPRETATION (SENTHIL,COR,MAD)    Collection Time: 25 10:36 AM    Specimen: Cervix, Endocervix; ThinPrep Vial   Result Value Ref Range    Reference Lab Report       [facility]  [address] [place] [address]  Phone: [phone] or [phone]  Fax: [phone]  Nirav Vu M.D., Medical Director    PATIENT NAME                           LABORATORY NO.  [id number]  MANNY CARRILLO                          [id number]  [id number]                         AGE              SEX  SSN           CLIENT REF #  [facility]       44      1980  F    [id number]   [id number]    [facility] [place]                     REQUESTING M.D.     ATTENDING M.D.     COPY TO.  [address] [facility] [address] [place] [address]                DATE COLLECTED      DATE RECEIVED      DATE REPORTED  2025    ThinPrep Pap with [facility]    DIAGNOSIS:  Negative for intraepithelial lesion or malignancy    Multiple factors can influence accuracy of Pap tests; therefore, screening at  regular intervals is necessary for early cancer detection.    COMMENT:   Benign cellular changes associated with reactive/reparative changes  are present.  Professional interpretation rendered by Amilcar Esteban M.D.,F.C.A.P. at [facility], [facility], [address] [place] [address].  SPECIMEN ADEQUACY:  SATISFACTORY FOR EVALUATION  Transformation zone is present.  Partially obscuring inflammation is present.  SOURCE OF SPECIMEN:  CERVICAL/ENDOCERVICAL  SLIDES:  1  CLINICAL HISTORY:  Encounter for gynecological examination without abnormal finding  IUD    HPV  HR-HPV POOL: Negative    The Aptima HPV assay is an in vitro nucleic acid amplification test for the  qualitative " detection of E6/E7 viral messenger RNA from 14 high risk types of  HPV in cervical specimens. The high risk HPV types detected include: 16, 18,  31, 33, 35, 39, 45, 51, 52, 56, 58, 59, 66, 68    Chlamydia / Gonorrhea  CHLAMYDIA TRACHOMATIS: Positive  NEISSERIA GONORRHOEAE: Negative    The Aptima Combo 2 assay is a target amplification nucleic acid probe test that  utilizes target capture for  the in vitro qualitative detection and differentiation of  ribosomal RNA from Chlamydia trachomatis and Neisseria gonorrhoeae to aid  in the diagnosis of chlamydial and gonococcal disease using the Mchenry system.    Trichomonas  TRICHOMONAS VAGINALIS: Negative    The Aptima Trichomonas vaginalis assay is an in vitro qualitative nucleic acid  amplification test for the detection of ribosomal RNA to aid in the diagnosis of  trichomoniasis.    CYTOTECHNOLOGIST:      VINAYAK ASHER (ASCP)                            REVIEWED, DIAGNOSED AND  ELECTRONICALLY SIGNED BY:      Amilcar Esteban M.D.,F.C.A.P.    CPT CODES:  39882, 46620, 84548, 26400, 45874, 93187         Assessment / Plan      1. Achilles tendonitis, bilateral  - The Achilles tendon is intact with no observable swelling or defects upon examination. Tenderness is noted along the distal aspect of achillees  - Supportive treatment options such as rest, ice, compression, and elevation were discussed.  - A referral to physical therapy will be made to facilitate controlled movement.  - Over-the-counter medications like Tylenol or ibuprofen can be used for pain management, ensuring they are taken with a full glass of water and food, and at the appropriate dose. Long-term use of prednisone is not advised due to potential side effects including elevated blood pressure, blood sugar, difficulty sleeping, and weight gain. If these measures prove ineffective, a consultation with an orthopedic provider or podiatrist may be considered.  - Ambulatory Referral to Physical Therapy for Evaluation &  Treatment    2. Morbid (severe) obesity due to excess calories  - A referral to a weight management program will be initiated.  - The potential use of GLP-1 receptor agonists were discussed, along with their associated risks and side effects.  - The importance of healthy diet and regular exercise was emphasized to prevent muscle mass loss.  - Will plan to begin weight loss medication pending lab results.   - Referral to weight management program ordered  - Lipid panel; Future  - Comprehensive metabolic panel; Future  - CBC No Differential; Future  - Hemoglobin A1c; Future  - TSH; Future  - Ambulatory Referral to Weight Management Program    3. Family history of diabetes mellitus  - Hemoglobin A1c; Future    4. Lipid screening  - Lipid panel; Future    5. Encounter for screening mammogram for malignant neoplasm of breast  - Mammo Screening Digital Tomosynthesis Bilateral With CAD; Future    6. Encounter for hepatitis C screening test for low risk patient  - Hepatitis C antibody; Future     Follow Up:   Return in about 1 month (around 8/7/2025), or lab f/u.    Patient or patient representative verbalized consent for the use of Ambient Listening during the visit with  Williams Markham PA-C for chart documentation. 7/7/2025  17:15 EDT    CARLOS Carnes

## 2025-07-08 ENCOUNTER — LAB (OUTPATIENT)
Dept: LAB | Facility: HOSPITAL | Age: 45
End: 2025-07-08
Payer: COMMERCIAL

## 2025-07-08 DIAGNOSIS — Z13.220 LIPID SCREENING: ICD-10-CM

## 2025-07-08 DIAGNOSIS — Z83.3 FAMILY HISTORY OF DIABETES MELLITUS: ICD-10-CM

## 2025-07-08 DIAGNOSIS — Z11.59 ENCOUNTER FOR HEPATITIS C SCREENING TEST FOR LOW RISK PATIENT: ICD-10-CM

## 2025-07-08 DIAGNOSIS — E66.01 MORBID (SEVERE) OBESITY DUE TO EXCESS CALORIES: ICD-10-CM

## 2025-07-08 LAB
ALBUMIN SERPL-MCNC: 3.9 G/DL (ref 3.5–5.2)
ALBUMIN/GLOB SERPL: 1.1 G/DL
ALP SERPL-CCNC: 80 U/L (ref 39–117)
ALT SERPL W P-5'-P-CCNC: 9 U/L (ref 1–33)
ANION GAP SERPL CALCULATED.3IONS-SCNC: 14 MMOL/L (ref 5–15)
AST SERPL-CCNC: 12 U/L (ref 1–32)
BILIRUB SERPL-MCNC: <0.2 MG/DL (ref 0–1.2)
BUN SERPL-MCNC: 11 MG/DL (ref 6–20)
BUN/CREAT SERPL: 11.3 (ref 7–25)
CALCIUM SPEC-SCNC: 9 MG/DL (ref 8.6–10.5)
CHLORIDE SERPL-SCNC: 103 MMOL/L (ref 98–107)
CHOLEST SERPL-MCNC: 183 MG/DL (ref 0–200)
CO2 SERPL-SCNC: 21 MMOL/L (ref 22–29)
CREAT SERPL-MCNC: 0.97 MG/DL (ref 0.57–1)
DEPRECATED RDW RBC AUTO: 45 FL (ref 37–54)
EGFRCR SERPLBLD CKD-EPI 2021: 74 ML/MIN/1.73
ERYTHROCYTE [DISTWIDTH] IN BLOOD BY AUTOMATED COUNT: 14.6 % (ref 12.3–15.4)
GLOBULIN UR ELPH-MCNC: 3.5 GM/DL
GLUCOSE SERPL-MCNC: 95 MG/DL (ref 65–99)
HBA1C MFR BLD: 6 % (ref 4.8–5.6)
HCT VFR BLD AUTO: 34.4 % (ref 34–46.6)
HCV AB SER QL: NORMAL
HDLC SERPL-MCNC: 40 MG/DL (ref 40–60)
HGB BLD-MCNC: 11.5 G/DL (ref 12–15.9)
LDLC SERPL CALC-MCNC: 124 MG/DL (ref 0–100)
LDLC/HDLC SERPL: 3.05 {RATIO}
MCH RBC QN AUTO: 28.6 PG (ref 26.6–33)
MCHC RBC AUTO-ENTMCNC: 33.4 G/DL (ref 31.5–35.7)
MCV RBC AUTO: 85.6 FL (ref 79–97)
PLATELET # BLD AUTO: 330 10*3/MM3 (ref 140–450)
PMV BLD AUTO: 10.2 FL (ref 6–12)
POTASSIUM SERPL-SCNC: 3.7 MMOL/L (ref 3.5–5.2)
PROT SERPL-MCNC: 7.4 G/DL (ref 6–8.5)
RBC # BLD AUTO: 4.02 10*6/MM3 (ref 3.77–5.28)
SODIUM SERPL-SCNC: 138 MMOL/L (ref 136–145)
TRIGL SERPL-MCNC: 106 MG/DL (ref 0–150)
TSH SERPL DL<=0.05 MIU/L-ACNC: 2.24 UIU/ML (ref 0.27–4.2)
VLDLC SERPL-MCNC: 19 MG/DL (ref 5–40)
WBC NRBC COR # BLD AUTO: 9.56 10*3/MM3 (ref 3.4–10.8)

## 2025-07-08 PROCEDURE — 80061 LIPID PANEL: CPT

## 2025-07-08 PROCEDURE — 83036 HEMOGLOBIN GLYCOSYLATED A1C: CPT

## 2025-07-08 PROCEDURE — 80050 GENERAL HEALTH PANEL: CPT

## 2025-07-08 PROCEDURE — 36415 COLL VENOUS BLD VENIPUNCTURE: CPT

## 2025-07-08 PROCEDURE — 86803 HEPATITIS C AB TEST: CPT

## 2025-07-09 ENCOUNTER — RESULTS FOLLOW-UP (OUTPATIENT)
Dept: FAMILY MEDICINE CLINIC | Facility: CLINIC | Age: 45
End: 2025-07-09
Payer: COMMERCIAL

## 2025-07-09 DIAGNOSIS — E78.00 PURE HYPERCHOLESTEROLEMIA: ICD-10-CM

## 2025-07-09 DIAGNOSIS — E66.01 MORBID (SEVERE) OBESITY DUE TO EXCESS CALORIES: Primary | ICD-10-CM

## 2025-07-09 DIAGNOSIS — Z83.3 FAMILY HISTORY OF DIABETES MELLITUS: ICD-10-CM

## 2025-07-09 DIAGNOSIS — R73.03 PREDIABETES: ICD-10-CM

## 2025-07-09 RX ORDER — SEMAGLUTIDE 0.25 MG/.5ML
0.25 INJECTION, SOLUTION SUBCUTANEOUS WEEKLY
Qty: 2 ML | Refills: 0 | Status: SHIPPED | OUTPATIENT
Start: 2025-07-09

## 2025-07-09 NOTE — TELEPHONE ENCOUNTER
Reviewed lab results with pt. Will start Wegovy at starting dose of 0.25 mg once weekly. Encouraged pt to take daily women's multivitamin with iron. Recommended 1 month f/u to discuss Wegovy medication further. Pt verbalizes understanding. All questions answered.

## 2025-07-10 ENCOUNTER — TELEPHONE (OUTPATIENT)
Dept: FAMILY MEDICINE CLINIC | Facility: CLINIC | Age: 45
End: 2025-07-10
Payer: COMMERCIAL

## 2025-07-23 ENCOUNTER — TELEPHONE (OUTPATIENT)
Dept: FAMILY MEDICINE CLINIC | Facility: CLINIC | Age: 45
End: 2025-07-23
Payer: COMMERCIAL

## 2025-07-23 NOTE — TELEPHONE ENCOUNTER
Caller: Hilaria Saldivar    Relationship: Self    Best call back number: [phone]    What was the call regarding: PT NEEDS STATUS ON HANDICAP FORM THAT WAS FILLED OUT AND DROPPED ON 7/7/25.THERE IS ALSO A STATUS NEEDED ON HER PA FOR LETHACARLITOSY    Is it okay if the provider responds through MyChart: N

## 2025-07-24 NOTE — TELEPHONE ENCOUNTER
I haven't seen this patient since 2021 to know handicap status long term. I can write 3 month given the heel pain Williams saw her for

## 2025-07-25 NOTE — TELEPHONE ENCOUNTER
No, the last message from Dr Garcia indicated that she would write for 3 months. I also discussed with Dr. Garcia who confirmed she would write it. I can write it if need be, but I do not have the form.

## 2025-07-30 ENCOUNTER — TREATMENT (OUTPATIENT)
Dept: PHYSICAL THERAPY | Facility: CLINIC | Age: 45
End: 2025-07-30
Payer: COMMERCIAL

## 2025-07-30 DIAGNOSIS — M76.61 ACHILLES TENDINITIS OF BOTH LOWER EXTREMITIES: Primary | ICD-10-CM

## 2025-07-30 DIAGNOSIS — M76.62 ACHILLES TENDINITIS OF BOTH LOWER EXTREMITIES: Primary | ICD-10-CM

## 2025-07-30 NOTE — PROGRESS NOTES
"Physical Therapy Initial Evaluation and Plan of Care  [facility] PHYSICAL THERAPY  [address] [address] [phone]      Patient: Hilaria Saldivar   : 1980  Diagnosis/ICD-10 Code:  Achilles tendinitis of both lower extremities [M76.61, M76.62]  Referring practitioner: Williams Markham PA-C  Date of Initial Visit: 2025  Today's Date: 8/3/2025  Patient seen for 1 session         Visit Diagnoses:    ICD-10-CM ICD-9-CM   1. Achilles tendinitis of both lower extremities  M76.61 726.71    M76.62        Patient Active Problem List    Diagnosis Date Noted    Prediabetes 2025    Pure hypercholesterolemia 2025    Morbid (severe) obesity due to excess calories 2025    Family history of diabetes mellitus 2025    Annual physical exam 12/15/2021    Mirena 2021     Note Last Updated: 3/17/2025     2022      Annual GYN exam 2021     Note Last Updated: 3/18/2025     SCREENING TESTS    Year 2017 2018 2019 2020   Age                       PAP - -    6  PCP -    3              HPV high risk         3              HUMPHREY [Birads]                       HILL (5 year)  Thomas Jaramillo (lifetime)                       Colonoscopy                       DEXA [T-score]  Frax [hip/any]                       Lipids  [LDL / HDL / TG]                       Vitamin D                       TSH                         Enter the month test was performed.  If month not known, enter \"X'  Black numbers = normal results  Red numbers = abnormal results  Black X = patient reported normal  Red X - patient reported abnormal      Referred by: Nini Mott (friends)   Profession: Counselor at [employer]   Other info: From [place]          Past Medical History:   Diagnosis Date    History of medical problems     Gul bladder     Past Surgical History:   Procedure Laterality Date    " "LAPAROSCOPIC CHOLECYSTECTOMY  2016       Subjective Questionnaire: LEFS: 68/80      Subjective Evaluation    History of Present Illness  Mechanism of injury: Has had pain in left > 2 years; gets stiff/tingly after sitting for a few minutes and getting up to walk  Insidious onset     Gets better with standing or walking     Walking long distances hurts     Has not tried PT  Ice/ibuprofen     Has a brace that she sometimes wears during the day     Saw a foot doctor in ; no tears, saw swelling; was on meds for a couple of weeks that helped     Also having some right foot pain    \"Starting my weight journey\"  Wants to be able to work out four times a week    Quality of life: fair    Pain  Current pain ratin  Quality: sharp, needle-like, discomfort, tight and dull ache  Relieving factors: rest and change in position  Aggravating factors: ambulation, prolonged positioning, sleeping, standing, stairs and squatting  Progression: no change    Patient Goals  Patient goals for therapy: return to sport/leisure activities, independence with ADLs/IADLs, increased strength, increased motion, decreased pain and improved balance             Objective          Observations     Right Ankle/Foot   Negative for edema.       Palpation     Right   No palpable tenderness to the posterior tibialis and soleus. Tenderness of the posterior tibialis and soleus.     Tenderness     Right Ankle/Foot   Tenderness in the Achilles insertion and medial calcaneus. No tenderness in the plantar fascia.     Neurological Testing     Sensation     Ankle/Foot   Left Ankle/Foot   Intact: light touch    Right Ankle/Foot   Intact: light touch     Active Range of Motion     Right Ankle/Foot   Dorsiflexion (ke): 5 degrees   Plantar flexion: WFL  Inversion: WFL  Eversion: WFL    Strength/Myotome Testing     Right Ankle/Foot   Dorsiflexion: 4  Plantar flexion: 4-  Inversion: 4-  Eversion: 4    Tests     Right Ankle/Foot   Negative for anterior drawer, " Homans' sign and Nettles.           Assessment & Plan       Assessment  Impairments: abnormal gait, abnormal or restricted ROM, impaired balance, impaired physical strength, lacks appropriate home exercise program, pain with function and weight-bearing intolerance   Functional limitations: sleeping, walking, uncomfortable because of pain, standing and unable to perform repetitive tasks   Assessment details: Pt presents to PT with signs and symptoms consistent with achilles tendonitis.  Pt would benefit from skilled PT intervention to address the deficits described.    Prognosis: good  Prognosis details:   Short term goals:  4 Weeks   1.Pt to be instructed in initial HEP.  2. c/o pain to 3/10 for ease with ambulation on TM/level surface up to 15 min without increase in s/s. sustained over 2-3 days.  3. Minimal palpable tenderness to achilles tendon.  4.  Increase ROM (DF to 10, PF to 35 ) to normalize gait, less early heel rise.  5. Pt able to balance on SLS 5 sec. on level surface to help promote safety on uneven terrain.    Long term goals:  8 weeks  1. Pt to demonstrate independencewith advanced HEP  2. Decrease c/o pain to 1/10 for ease with functional activity mentioned above>30 min.  3. Full (L) Ankle AROM 15º DF, PF: 45 degrees, Inv: 30, Ev: 15  4. LEFS > 70/80  (% perceived normal ability)  5. No palapable tenderness to achilles tendon.   6.  SLS balance on uneven surface for up to 15 sec. For increased safety and endurance for walking on outdoor uneven surfaces.   7.  Strength to 5/5 all planes of ankle as needed for prolonged ambulation as needed for home/self-care.      Plan  Therapy options: will be seen for skilled therapy services  Planned modality interventions: ultrasound, TENS, cryotherapy, dry needling, iontophoresis, thermotherapy (hydrocollator packs) and high voltage pulsed current (pain management)  Planned therapy interventions: joint mobilization, home exercise program, gait training,  "flexibility, balance/weight-bearing training, manual therapy, neuromuscular re-education, soft tissue mobilization, strengthening, stretching, therapeutic activities, orthotic fitting/training and functional ROM exercises  Frequency: 1x week  Duration in visits: 8  Treatment plan discussed with: patient  Plan details: Recommend night splint         Access Code: [email or web address]  URL: [email or web address]  Date: 07/30/2025  Prepared by: Preet Ruelas    Exercises  - Gastroc Stretch on Wall  - 3 x daily - 7 x weekly - 1 sets - 10 reps - 15\" hold  - Soleus Stretch on Wall  - 3 x daily - 7 x weekly - 1 sets - 10 reps - 15\" hold  - Long Sitting Calf Stretch with Strap  - 3 x daily - 7 x weekly - 1 sets - 10 reps - 15\" hold    Timed:         Manual Therapy:    15     mins  47406;     Therapeutic Exercise:    10     mins  29863;     Neuromuscular Jeremiah:        mins  37556;    Therapeutic Activity:          mins  24705;     Gait Training:           mins  11477;     Ultrasound:          mins  46736;    Ionto                                   mins   68635  Self Care                            mins   89207  Canalith Repos         mins 00242      Un-Timed:  Electrical Stimulation:         mins  44972 ( );  Dry Needling          mins self-pay  Traction          mins 13572    Low Eval     25     Mins  36784  Mod Eval          Mins  90271  High Eval                            Mins  77505        Timed Treatment:   25   mins   Total Treatment:     50   mins          PT: BARON Ruelas, PT     License Number: [id number]  Electronically signed by BARON Ruelas, PT, 07/30/25, 3:06 PM EDT    Certification Period: 8/3/2025 thru 10/31/2025  I certify that the therapy services are furnished while this patient is under my care.  The services outlined above are required by this patient, and will be reviewed every 90 days.         Physician Signature:__________________________________________________    PHYSICIAN: Williams Markham, " CARLOS  NPI: [id number]                                      DATE:      Please sign and return via fax to [email or web address] . Thank you, [facility].